# Patient Record
Sex: MALE | Race: WHITE | Employment: FULL TIME | URBAN - METROPOLITAN AREA
[De-identification: names, ages, dates, MRNs, and addresses within clinical notes are randomized per-mention and may not be internally consistent; named-entity substitution may affect disease eponyms.]

---

## 2017-07-31 ENCOUNTER — HOSPITAL ENCOUNTER (EMERGENCY)
Age: 37
Discharge: HOME | End: 2017-07-31
Attending: EMERGENCY MEDICINE
Payer: COMMERCIAL

## 2017-07-31 VITALS
BODY MASS INDEX: 31.18 KG/M2 | HEIGHT: 69 IN | HEART RATE: 59 BPM | WEIGHT: 210.54 LBS | OXYGEN SATURATION: 98 % | DIASTOLIC BLOOD PRESSURE: 86 MMHG | TEMPERATURE: 97.9 F | SYSTOLIC BLOOD PRESSURE: 127 MMHG | RESPIRATION RATE: 18 BRPM

## 2017-07-31 DIAGNOSIS — M54.41 ACUTE RIGHT-SIDED LOW BACK PAIN WITH RIGHT-SIDED SCIATICA: Primary | ICD-10-CM

## 2017-07-31 PROCEDURE — 96372 THER/PROPH/DIAG INJ SC/IM: CPT

## 2017-07-31 PROCEDURE — 99282 EMERGENCY DEPT VISIT SF MDM: CPT

## 2017-07-31 PROCEDURE — 74011250636 HC RX REV CODE- 250/636: Performed by: PHYSICIAN ASSISTANT

## 2017-07-31 RX ORDER — CYCLOBENZAPRINE HCL 10 MG
10 TABLET ORAL
Qty: 20 TAB | Refills: 0 | Status: SHIPPED | OUTPATIENT
Start: 2017-07-31

## 2017-07-31 RX ORDER — ROSUVASTATIN CALCIUM 10 MG/1
TABLET, COATED ORAL
COMMUNITY

## 2017-07-31 RX ORDER — OXYCODONE AND ACETAMINOPHEN 5; 325 MG/1; MG/1
1 TABLET ORAL
Qty: 20 TAB | Refills: 0 | Status: SHIPPED | OUTPATIENT
Start: 2017-07-31

## 2017-07-31 RX ORDER — HYDROMORPHONE HYDROCHLORIDE 1 MG/ML
1 INJECTION, SOLUTION INTRAMUSCULAR; INTRAVENOUS; SUBCUTANEOUS
Status: COMPLETED | OUTPATIENT
Start: 2017-07-31 | End: 2017-07-31

## 2017-07-31 RX ADMIN — HYDROMORPHONE HYDROCHLORIDE 1 MG: 1 INJECTION, SOLUTION INTRAMUSCULAR; INTRAVENOUS; SUBCUTANEOUS at 07:51

## 2017-07-31 RX ADMIN — METHYLPREDNISOLONE SODIUM SUCCINATE 125 MG: 125 INJECTION, POWDER, FOR SOLUTION INTRAMUSCULAR; INTRAVENOUS at 07:51

## 2017-07-31 NOTE — LETTER
[address] 
[facility] EMERGENCY DEPT 
[address] [facility] [address] [id number] [phone] Work/School Note Date: 7/31/2017 To Whom It May concern: 
 
Bruce Lane was seen and treated today in the emergency room by the following provider(s): 
Attending Provider: Maggie Joy MD 
Physician Assistant: FERNANDEZ Proctor. Please excuse Bruce Lane from work today and tomorrow. Sincerely, Zoe Proctor

## 2017-07-31 NOTE — DISCHARGE INSTRUCTIONS
Getting Back to Normal After Low Back Pain: Care Instructions  Your Care Instructions  Almost everyone has low back pain at some time. The good news is that most low back pain will go away in a few days or weeks with some basic self-care. Some people are afraid that doing too much may make their pain worse. In the past, people stayed in bed, thinking this would help their backs. Now doctors think that, in most cases, getting back to your normal activities is good for your back, as long as you avoid doing things that make your pain worse. Follow-up care is a key part of your treatment and safety. Be sure to make and go to all appointments, and call your doctor if you are having problems. It's also a good idea to know your test results and keep a list of the medicines you take. How can you care for yourself at home? Ease back into daily activities  · For the first day or two of pain, take it easy. But as soon as possible, get back to your normal daily life and activities. · Get gentle exercise, such as walking. Movement keeps your spine flexible and helps your muscles stay strong. · If you are an athlete, return to your activity carefully. Choose a low-impact option until your pain is under control. Avoid or change activities that cause pain  · Try to avoid too much bending, heavy lifting, or reaching. These movements put extra stress on your back. · In bed, try lying on your side with a pillow between your knees. Or lie on your back on the floor with a pillow under your knees. · When you sit, place a small pillow, a rolled-up towel, or a lumbar roll in the curve of your back for extra support. · Try putting one foot up on a stool or changing positions every few minutes if you have to stand still for a period of time. Pay attention to body mechanics and posture  Body mechanics are the way you use your body. Posture is the way you sit or stand. · Take extra care when you lift.  When you must lift, bend your knees and keep your back straight. Avoid twisting, and keep the load close to your body. · Stand or sit tall, with your shoulders back and your stomach pulled in to support your back. Get support when you need it  · Let people know when you need a helping hand. Get family members or friends to help out with tasks you cannot do right now. · Be honest with your doctor about how the pain affects you. · If you have had to take time off work, talk to your doctor and boss about a gradual [id number]-[id number] plan. Find out if there are other ways you could do your job to avoid hurting your back again. Reduce stress  Worrying about the pain can cause you to tense the muscles in your lower back. This in turn causes more pain. Here are a few things you can do to relax your mind and your muscles:  · Take 10 to 15 minutes to sit quietly and breathe deeply. Try to focus only on your breathing. If you cannot keep thoughts away, think about things that make you feel good. · Get involved in your favorite hobby, or try something new. · Talk to a friend, read a book, or listen to your favorite music. · Find a counselor you like and trust. Talk openly and honestly about your problems. Be willing to make some changes. When should you call for help? Call 911 anytime you think you may need emergency care. For example, call if:  · You are unable to move a leg at all. Call your doctor now or seek immediate medical care if:  · You have new or worse symptoms in your legs, belly, or buttocks. Symptoms may include:  ¨ Numbness or tingling. ¨ Weakness. ¨ Pain. · You lose bladder or bowel control. Watch closely for changes in your health, and be sure to contact your doctor if:  · You are not getting better as expected. Where can you learn more? Go to http://jaswant-katie.info/. Enter Q917 in the search box to learn more about \"Getting Back to Normal After Low Back Pain: Care Instructions. \"  Current as of: March 21, 2017  Content Version: 11.3  © [id number] [employer]. Care instructions adapted under license by [employer] (which disclaims liability or warranty for this information). If you have questions about a medical condition or this instruction, always ask your healthcare professional. [address] any warranty or liability for your use of this information. Sciatica: Care Instructions  Your Care Instructions    Sciatica (say \"jco-GM-lj-kuh\") is an irritation of one of the sciatic nerves, which come from the spinal cord in the lower back. The sciatic nerves and their branches extend down through the buttock to the foot. Sciatica can develop when an injured disc in the back presses against a spinal nerve root. Its main symptom is pain, numbness, or weakness that is often worse in the leg or foot than in the back. Sciatica often will improve and go away with time. Early treatment usually includes medicines and exercises to relieve pain. Follow-up care is a key part of your treatment and safety. Be sure to make and go to all appointments, and call your doctor if you are having problems. It's also a good idea to know your test results and keep a list of the medicines you take. How can you care for yourself at home? · Take pain medicines exactly as directed. ¨ If the doctor gave you a prescription medicine for pain, take it as prescribed. ¨ If you are not taking a prescription pain medicine, ask your doctor if you can take an over-the-counter medicine. · Use heat or ice to relieve pain. ¨ To apply heat, put a warm water bottle, heating pad set on low, or warm cloth on your back. Do not go to sleep with a heating pad on your skin. ¨ To use ice, put ice or a cold pack on the area for 10 to 20 minutes at a time. Put a thin cloth between the ice and your skin. · Avoid sitting if possible, unless it feels better than standing.   · Alternate lying down with short walks. Increase your walking distance as you are able to without making your symptoms worse. · Do not do anything that makes your symptoms worse. When should you call for help? Call 911 anytime you think you may need emergency care. For example, call if:  · You are unable to move a leg at all. Call your doctor now or seek immediate medical care if:  · You have new or worse symptoms in your legs or buttocks. Symptoms may include:  ¨ Numbness or tingling. ¨ Weakness. ¨ Pain. · You lose bladder or bowel control. Watch closely for changes in your health, and be sure to contact your doctor if:  · You are not getting better as expected. Where can you learn more? Go to http://jaswant-katie.info/. Enter [phone] in the search box to learn more about \"Sciatica: Care Instructions. \"  Current as of: March 21, 2017  Content Version: 11.3  © 5974-4929 [employer]. Care instructions adapted under license by [employer] (which disclaims liability or warranty for this information). If you have questions about a medical condition or this instruction, always ask your healthcare professional. [place] [address] any warranty or liability for your use of this information.

## 2017-07-31 NOTE — ED NOTES
Patient experienced a vasovagal response while getting IM injections. Placed in trendelenberg position and observed.

## 2017-07-31 NOTE — ED PROVIDER NOTES
HPI Comments: Latisha Armas is a 44 y.o. male with hx of hypothyroidism, sciatica and two microdiscectomies who presents ambulatory to the ED c/o R side back pain and numbness that radiates to the R leg x one week. Pt reports having epidural shots every 8 months, with last shot in Sept 2016. He notes taking percocet and prednisone without significant relief, with last percocet dose 6:30 PM last night. He states he has contacted Dr. Vishal Mendoza (neurosurgery) who had Dr. Bailey Dy schedule an appointment for pain management on 8/23/17. Pt also notes recently seeing his PCP five days ago. Pt specifically denies fever, chills, sore throat, rhinorrhea, cough, SOB, CP, HA, N/V/D, blood in stool, urinary/fecal incontinence, saddle paresthesia, and hematuria. Social hx: - Tobacco use, +7.2DK/GO EtOH use, - Illicit drug use    PCP: Shadia Peña MD    There are no other complaints, changes or physical findings at this time. The history is provided by the patient. No  was used. Past Medical History:   Diagnosis Date    Hypothyroidism     Psychiatric disorder     anxiety    Thyroid disease     Unspecified sleep apnea     c-pap machine       Past Surgical History:   Procedure Laterality Date    HX HERNIA REPAIR      bilateral -as a child    HX ORTHOPAEDIC  oct. 2010    Lumbar discectomy    HX ORTHOPAEDIC  2012    Rt hand cyst removed    HX TONSILLECTOMY      uvula procedure also    HX UROLOGICAL      varicocele    NEUROLOGICAL PROCEDURE UNLISTED  2011    herniated disc x2         History reviewed. No pertinent family history. Social History     Social History    Marital status:      Spouse name: N/A    Number of children: N/A    Years of education: N/A     Occupational History    Not on file.      Social History Main Topics    Smoking status: Never Smoker    Smokeless tobacco: Never Used    Alcohol use 2.4 oz/week     4 Cans of beer per week      Comment: monthly    Drug use: Yes     Special: Prescription, OTC    Sexual activity: Not on file     Other Topics Concern    Not on file     Social History Narrative         ALLERGIES: Review of patient's allergies indicates no known allergies. Review of Systems   Constitutional: Negative. Negative for chills and fever. HENT: Negative. Negative for rhinorrhea and sore throat. Respiratory: Negative for cough and shortness of breath. Cardiovascular: Negative. Negative for chest pain. Gastrointestinal: Negative. Negative for blood in stool, diarrhea, nausea and vomiting. Negative for fecal incontinence. Endocrine: Negative. Genitourinary: Negative. Negative for hematuria. Negative for urinary incontinence. Musculoskeletal: Positive for back pain (R sided) and myalgias (R leg). Neurological: Positive for numbness (R back and R leg). Negative for headaches. Psychiatric/Behavioral: Negative. All other systems reviewed and are negative. Vitals:    07/31/17 0345 07/31/17 0801   BP: (!) 147/98 127/86   Pulse: 84 (!) 59   Resp: 16 18   Temp: 97.9 °F (36.6 °C)    SpO2: 97% 98%   Weight: 95.5 kg (210 lb 8.6 oz)    Height: 5' 9\" (1.753 m)             Physical Exam   Constitutional: He is oriented to person, place, and time. He appears well-developed and well-nourished. No distress. 45 yo  male   HENT:   Head: Normocephalic and atraumatic. Eyes: Conjunctivae are normal. Right eye exhibits no discharge. Left eye exhibits no discharge. Neck: Normal range of motion. Neck supple. Cardiovascular: Normal rate, regular rhythm and normal heart sounds. No murmur heard. Pulmonary/Chest: Effort normal and breath sounds normal. No respiratory distress. He has no wheezes. Musculoskeletal:   BACK: Normal spinal curvatures. No step off or deformity. NT to palpation. Pain with supine SLR bilaterally on the right. Strength of the LE 5/5 and equal bilaterally. Ambulatory without difficulty. Neurological: He is alert and oriented to person, place, and time. Skin: Skin is warm and dry. He is not diaphoretic. Psychiatric: He has a normal mood and affect. His behavior is normal.   Nursing note and vitals reviewed. MDM  Number of Diagnoses or Management Options  Diagnosis management comments: DDx: DDD, DJD, herniated disc, sciatica, exacerbation of chronic pain       Amount and/or Complexity of Data Reviewed  Review and summarize past medical records: yes    Patient Progress  Patient progress: stable    ED Course       Procedures    7:49 AM   reviewed. Pt has only filled one controlled substance in the past 12 months. Percocet 5 mg (#20) filled in December 2016. MEDICATIONS GIVEN:  Medications   HYDROmorphone (PF) (DILAUDID) injection 1 mg (1 mg IntraMUSCular Given 7/31/17 0751)   methylPREDNISolone (PF) (SOLU-MEDROL) injection 125 mg (125 mg IntraMUSCular Given 7/31/17 0751)       IMPRESSION:  1. Acute right-sided low back pain with right-sided sciatica        PLAN: Discharge home  1. Current Discharge Medication List      START taking these medications    Details   cyclobenzaprine (FLEXERIL) 10 mg tablet Take 1 Tab by mouth three (3) times daily as needed for Muscle Spasm(s). Qty: 20 Tab, Refills: 0         CONTINUE these medications which have CHANGED    Details   oxyCODONE-acetaminophen (PERCOCET) 5-325 mg per tablet Take 1 Tab by mouth every four (4) hours as needed for Pain. Max Daily Amount: 6 Tabs. Do not drive for 6 hours after taking, may impair ability to drive  Qty: 20 Tab, Refills: 0         CONTINUE these medications which have NOT CHANGED    Details   rosuvastatin (CRESTOR) 10 mg tablet Take  by mouth nightly. ondansetron (ZOFRAN ODT) 4 mg disintegrating tablet Take 1 Tab by mouth every six (6) hours as needed for Nausea. Qty: 30 Tab, Refills: 0      LEVOTHYROXINE SODIUM (LEVOTHYROXINE PO) Take 0.88 mg by mouth nightly.       sertraline (ZOLOFT) 100 mg tablet Take 100 mg by mouth every evening. 2.   Follow-up Information     Follow up With Details Comments Contact Info    Kaya Yuan MD In 2 days  [address] [facility] [address] Chasidy Amaral MD In 2 days  [facility] [address]       Rebecca Bee MD In 2 days  [facility]  [address]  [phone]          3. Return to ED if worse   4. Rest, heat, massage and gentle stretches. Avoid heavy lifting. DISCHARGE NOTE  8:44 AM  The patient has been re-evaluated and is ready for discharge. Reviewed available results with patient. Counseled pt on diagnosis and care plan. Pt has expressed understanding, and all questions have been answered. Pt agrees with plan and agrees to F/U as recommended, or return to the ED if their sxs worsen. Discharge instructions have been provided and explained to the pt, along with reasons to return to the ED. This note is prepared by Lenin Parra, acting as Scribe for NIKOLAI Kelley : The scribe's documentation has been prepared under my direction and personally reviewed by me in its entirety. I confirm that the note above accurately reflects all work, treatment, procedures, and medical decision making performed by me.

## 2017-07-31 NOTE — ED TRIAGE NOTES
Pt. Presents ambulatory to ED today for complaints of R sided sciatic pain. Pt. Reports a history of problems with this. Pt. Alert and oriented x4. Pt. Placed in position of comfort with call bell in reach.

## 2017-07-31 NOTE — ED NOTES
Patient discharged by FERNANDEZ Reagan. Patient provided with discharge instructions Rx and instructions on follow up care. Patient out of ED ambulatory accompanied by family.